# Patient Record
Sex: MALE
[De-identification: names, ages, dates, MRNs, and addresses within clinical notes are randomized per-mention and may not be internally consistent; named-entity substitution may affect disease eponyms.]

---

## 2018-09-04 ENCOUNTER — HOSPITAL ENCOUNTER (EMERGENCY)
Dept: HOSPITAL 5 - ED | Age: 58
Discharge: HOME | End: 2018-09-04
Payer: COMMERCIAL

## 2018-09-04 VITALS — DIASTOLIC BLOOD PRESSURE: 80 MMHG | SYSTOLIC BLOOD PRESSURE: 140 MMHG
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
## 2018-09-04 DIAGNOSIS — Z88.0: ICD-10-CM
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
## 2018-09-04 DIAGNOSIS — I10: ICD-10-CM

## 2018-09-04 DIAGNOSIS — S96.912A: Primary | ICD-10-CM

## 2018-09-04 NOTE — XRAY REPORT
LEFT FOOT, 3 views:



History: Pain and swelling. Crush injury.



The bony architecture is intact.  Bony alignment is normal.

No soft tissue abnormalities are seen.  The joint spaces appear

preserved. A small plantar spur is identified.



IMPRESSION:

No acute injury is identified.

Small plantar spur.

## 2018-09-04 NOTE — EMERGENCY DEPARTMENT REPORT
ED Lower Extremity HPI





- General


Chief Complaint: Extremity Injury, Lower


Stated Complaint: LFT FOOT POSS BROKE/PAIN


Time Seen by Provider: 09/04/18 17:58


Source: patient


Mode of arrival: Ambulatory


Limitations: No Limitations





- History of Present Illness


Initial Comments: 





This is a 57-year-old male nontoxic, well nourished in appearance, no acute 

signs of distress presents to the ED with c/o of left foot pain 1 day.  

Patient stated that a heavy metal object at work landed on foot this morning.  

Patient denies any other trauma.  Patient denies any numbness, tingling, fever, 

chills, nausea, vomiting, chest pain, shortness of breath, headache, stiff 

neck.  Patient denies any joint swelling or joint redness.  Patient denies 

decreased range of motion.  Patient stated has decreased gait due to pain.  

Patient stated allergies to PCN.


MD Complaint: foot injury


-: This morning


Injury: Foot: Left


Type of Injury: blunt


Place: work


Severity: mild


Severity scale (0 -10): 8


Improves With: immobilization


Worsens With: weight bearing, movement, palpation


Context: direct blow


Associated Symptoms: swelling, able to partially bear weight.  denies: snap/pop 

sensation, numbness, tingling, unable to bear weight, ambulatory





- Related Data


 Previous Rx's











 Medication  Instructions  Recorded  Last Taken  Type


 


Acetaminophen/Codeine [Tylenol 1 tab PO Q6H PRN #12 tab 09/04/18 Unknown Rx





/Codeine # 3 tab]    


 


Ibuprofen [Motrin] 600 mg PO Q8H PRN #30 tablet 09/04/18 Unknown Rx











 Allergies











Allergy/AdvReac Type Severity Reaction Status Date / Time


 


Penicillins Allergy  Unknown Verified 09/04/18 13:58














ED Review of Systems


ROS: 


Stated complaint: LFT FOOT POSS BROKE/PAIN


Other details as noted in HPI





Constitutional: denies: chills, fever


Eyes: denies: eye pain, eye discharge, vision change


ENT: denies: ear pain, throat pain


Respiratory: denies: cough, shortness of breath, wheezing


Cardiovascular: denies: chest pain, palpitations


Endocrine: no symptoms reported


Gastrointestinal: denies: abdominal pain, nausea, diarrhea


Genitourinary: denies: urgency, dysuria


Musculoskeletal: denies: back pain, joint swelling, arthralgia


Skin: denies: rash, lesions


Neurological: denies: headache, weakness, paresthesias


Psychiatric: denies: anxiety, depression


Hematological/Lymphatic: denies: easy bleeding, easy bruising





ED Past Medical Hx





- Past Medical History


Hx Hypertension: Yes





- Surgical History


Past Surgical History?: No





- Social History


Smoking Status: Never Smoker


Substance Use Type: None





- Medications


Home Medications: 


 Home Medications











 Medication  Instructions  Recorded  Confirmed  Last Taken  Type


 


Acetaminophen/Codeine [Tylenol 1 tab PO Q6H PRN #12 tab 09/04/18  Unknown Rx





/Codeine # 3 tab]     


 


Ibuprofen [Motrin] 600 mg PO Q8H PRN #30 tablet 09/04/18  Unknown Rx














ED Physical Exam





- General


Limitations: No Limitations


General appearance: alert, in no apparent distress





- Head


Head exam: Present: atraumatic, normocephalic





- Eye


Eye exam: Present: normal appearance





- ENT


ENT exam: Present: mucous membranes moist





- Neck


Neck exam: Present: normal inspection





- Respiratory


Respiratory exam: Present: normal lung sounds bilaterally.  Absent: respiratory 

distress





- Cardiovascular


Cardiovascular Exam: Present: regular rate, normal rhythm.  Absent: systolic 

murmur, diastolic murmur, rubs, gallop





- GI/Abdominal


GI/Abdominal exam: Present: soft, normal bowel sounds





- Rectal


Rectal exam: Present: deferred





- Extremities Exam


Extremities exam: Present: normal inspection, full ROM, tenderness, normal 

capillary refill.  Absent: joint swelling





- Expanded Lower Extremity Exam


  ** Left


Hip exam: Present: normal inspection, full ROM.  Absent: tenderness, swelling


Upper Leg exam: Present: normal inspection, full ROM.  Absent: tenderness, 

swelling


Knee exam: Present: normal inspection, full ROM.  Absent: tenderness, swelling


Lower Leg exam: Present: normal inspection, full ROM.  Absent: tenderness, 

swelling


Ankle exam: Present: normal inspection, full ROM.  Absent: tenderness, swelling


Foot/Toe exam: Present: normal inspection, full ROM, tenderness, swelling, 

ecchymosis.  Absent: abrasion, laceration, deformity, crepidus, dislocation, 

erythema, amputation, puncture wound, foreign body, calcaneal tenderness, 

tenderness at base of 5th metatarsal, nail avulsion, subungual hematoma


Neuro vascular tendon exam: Present: no vascular compromise.  Absent: pulse 

deficit, abnormal cap refill, motor deficit, sensory deficit, tendon deficit, 

extremity cold to touch, pallor, abnormal 2-point discrimination, decreased fine

/light touch, foot drop, peroneal nerve deficit, significant pain with passive 

ROM of distal joint


Gait: Positive: observed and limited by pain





- Back Exam


Back exam: Present: normal inspection, full ROM





- Neurological Exam


Neurological exam: Present: alert, oriented X3, normal gait





- Psychiatric


Psychiatric exam: Present: normal affect, normal mood





- Skin


Skin exam: Present: warm, dry, intact, normal color.  Absent: rash





ED Course





 Vital Signs











  09/04/18





  13:56


 


Temperature 98.2 F


 


Pulse Rate 74


 


Respiratory 18





Rate 


 


Blood Pressure 140/93


 


O2 Sat by Pulse 97





Oximetry 














- Reevaluation(s)


Reevaluation #1: 





09/04/18 18:06


Patient is speaking in full sentences with no signs of distress noted.





ED Lower Extremity MDM





- Medical Decision Making





This is a 57-year-old male that presents with left foot strain.  Patient is 

stable and was examined by me.  I referred patient to an orthopedic doctor for 

further evaluation for possible MRI.  X-ray has been obtained and dictated by 

the radiologist.  Patient is notified of the x-ray report with noted by the 

patient.  Patient does have normal gait with no tenderness and no joint 

swelling.  No ecchymosis. no joint redness or swelling. Not warm to touch. No 

signs of cellulites present. Patient received a ortho shoe.  Patient was 

instructed to RICE therapy.  Patient received Motrin for pain.  Patient is 

discharged with Motrin and Tylenol #3. At time of discharge, the patient does 

not seem toxic or ill in appearance.  No acute signs of distress noted.  

Patient agrees to discharge treatment plan of care.  No further questions noted 

by the patient.


Critical care attestation.: 


If time is entered above; I have spent that time in minutes in the direct care 

of this critically ill patient, excluding procedure time.








ED Disposition


Clinical Impression: 


Strain of left foot


Qualifiers:


 Encounter type: initial encounter Qualified Code(s): S96.912A - Strain of 

unspecified muscle and tendon at ankle and foot level, left foot, initial 

encounter





Foot contusion


Qualifiers:


 Encounter type: initial encounter Laterality: left Qualified Code(s): S90.32XA 

- Contusion of left foot, initial encounter





Disposition: DC-01 TO HOME OR SELFCARE


Is pt being admited?: No


Does the pt Need Aspirin: No


Condition: Stable


Instructions:  RICE Therapy (ED), Acetaminophen/Codeine (By mouth)


Additional Instructions: 


Follow-up with a orthopedic doctor in 3-5 days or if symptoms worsen and 

continue return to emergency room as soon as possible. 


Prescriptions: 


Acetaminophen/Codeine [Tylenol /Codeine # 3 tab] 1 tab PO Q6H PRN #12 tab


 PRN Reason: Pain , Severe (7-10)


Ibuprofen [Motrin] 600 mg PO Q8H PRN #30 tablet


 PRN Reason: Pain


Referrals: 


AUSTIN FAN MD [Primary Care Provider] - 3-5 Days


[facility] C[facility],MD [Referring] - 3-5 Days


CRYSTAL CUEVAS MD [Staff Physician] - 3-5 Days


[facility] [Outside] - 3-5 Days


Forms:  Work/School Release Form(ED)